# Patient Record
(demographics unavailable — no encounter records)

---

## 2024-10-18 NOTE — HISTORY OF PRESENT ILLNESS
[FreeTextEntry1] : Partner age 28 PCOS.  Trying > 6 mos.  Denies infections or STD's.  Non smoker.  No surgery or febrile illness.   [None] : no symptoms